# Patient Record
Sex: FEMALE | Race: ASIAN | NOT HISPANIC OR LATINO | URBAN - METROPOLITAN AREA
[De-identification: names, ages, dates, MRNs, and addresses within clinical notes are randomized per-mention and may not be internally consistent; named-entity substitution may affect disease eponyms.]

---

## 2019-06-03 ENCOUNTER — EMERGENCY (EMERGENCY)
Facility: HOSPITAL | Age: 64
LOS: 1 days | Discharge: HOME | End: 2019-06-03
Attending: EMERGENCY MEDICINE | Admitting: EMERGENCY MEDICINE
Payer: MEDICAID

## 2019-06-03 VITALS
OXYGEN SATURATION: 98 % | TEMPERATURE: 98 F | HEART RATE: 84 BPM | RESPIRATION RATE: 14 BRPM | SYSTOLIC BLOOD PRESSURE: 181 MMHG | DIASTOLIC BLOOD PRESSURE: 99 MMHG

## 2019-06-03 VITALS
OXYGEN SATURATION: 100 % | HEART RATE: 65 BPM | SYSTOLIC BLOOD PRESSURE: 193 MMHG | RESPIRATION RATE: 18 BRPM | DIASTOLIC BLOOD PRESSURE: 67 MMHG

## 2019-06-03 LAB
ALBUMIN SERPL ELPH-MCNC: 4.4 G/DL — SIGNIFICANT CHANGE UP (ref 3.3–5)
ALP SERPL-CCNC: 116 U/L — SIGNIFICANT CHANGE UP (ref 40–120)
ALT FLD-CCNC: 40 U/L — HIGH (ref 4–33)
ANION GAP SERPL CALC-SCNC: 12 MMO/L — SIGNIFICANT CHANGE UP (ref 7–14)
APPEARANCE UR: CLEAR — SIGNIFICANT CHANGE UP
AST SERPL-CCNC: 34 U/L — HIGH (ref 4–32)
BASOPHILS # BLD AUTO: 0.05 K/UL — SIGNIFICANT CHANGE UP (ref 0–0.2)
BASOPHILS NFR BLD AUTO: 0.7 % — SIGNIFICANT CHANGE UP (ref 0–2)
BILIRUB SERPL-MCNC: 0.7 MG/DL — SIGNIFICANT CHANGE UP (ref 0.2–1.2)
BILIRUB UR-MCNC: NEGATIVE — SIGNIFICANT CHANGE UP
BLOOD UR QL VISUAL: NEGATIVE — SIGNIFICANT CHANGE UP
BUN SERPL-MCNC: 20 MG/DL — SIGNIFICANT CHANGE UP (ref 7–23)
CALCIUM SERPL-MCNC: 9 MG/DL — SIGNIFICANT CHANGE UP (ref 8.4–10.5)
CHLORIDE SERPL-SCNC: 107 MMOL/L — SIGNIFICANT CHANGE UP (ref 98–107)
CO2 SERPL-SCNC: 25 MMOL/L — SIGNIFICANT CHANGE UP (ref 22–31)
COLOR SPEC: COLORLESS — SIGNIFICANT CHANGE UP
CREAT SERPL-MCNC: 0.91 MG/DL — SIGNIFICANT CHANGE UP (ref 0.5–1.3)
EOSINOPHIL # BLD AUTO: 0.39 K/UL — SIGNIFICANT CHANGE UP (ref 0–0.5)
EOSINOPHIL NFR BLD AUTO: 5.6 % — SIGNIFICANT CHANGE UP (ref 0–6)
GLUCOSE SERPL-MCNC: 172 MG/DL — HIGH (ref 70–99)
GLUCOSE UR-MCNC: NEGATIVE — SIGNIFICANT CHANGE UP
HCT VFR BLD CALC: 41 % — SIGNIFICANT CHANGE UP (ref 34.5–45)
HGB BLD-MCNC: 12.9 G/DL — SIGNIFICANT CHANGE UP (ref 11.5–15.5)
IMM GRANULOCYTES NFR BLD AUTO: 0.3 % — SIGNIFICANT CHANGE UP (ref 0–1.5)
KETONES UR-MCNC: NEGATIVE — SIGNIFICANT CHANGE UP
LEUKOCYTE ESTERASE UR-ACNC: HIGH
LYMPHOCYTES # BLD AUTO: 1.79 K/UL — SIGNIFICANT CHANGE UP (ref 1–3.3)
LYMPHOCYTES # BLD AUTO: 25.8 % — SIGNIFICANT CHANGE UP (ref 13–44)
MCHC RBC-ENTMCNC: 27.7 PG — SIGNIFICANT CHANGE UP (ref 27–34)
MCHC RBC-ENTMCNC: 31.5 % — LOW (ref 32–36)
MCV RBC AUTO: 88.2 FL — SIGNIFICANT CHANGE UP (ref 80–100)
MONOCYTES # BLD AUTO: 0.62 K/UL — SIGNIFICANT CHANGE UP (ref 0–0.9)
MONOCYTES NFR BLD AUTO: 8.9 % — SIGNIFICANT CHANGE UP (ref 2–14)
NEUTROPHILS # BLD AUTO: 4.06 K/UL — SIGNIFICANT CHANGE UP (ref 1.8–7.4)
NEUTROPHILS NFR BLD AUTO: 58.7 % — SIGNIFICANT CHANGE UP (ref 43–77)
NITRITE UR-MCNC: NEGATIVE — SIGNIFICANT CHANGE UP
NRBC # FLD: 0 K/UL — SIGNIFICANT CHANGE UP (ref 0–0)
PH UR: 7 — SIGNIFICANT CHANGE UP (ref 5–8)
PLATELET # BLD AUTO: 255 K/UL — SIGNIFICANT CHANGE UP (ref 150–400)
PMV BLD: 10.7 FL — SIGNIFICANT CHANGE UP (ref 7–13)
POTASSIUM SERPL-MCNC: 4.4 MMOL/L — SIGNIFICANT CHANGE UP (ref 3.5–5.3)
POTASSIUM SERPL-SCNC: 4.4 MMOL/L — SIGNIFICANT CHANGE UP (ref 3.5–5.3)
PROT SERPL-MCNC: 7.1 G/DL — SIGNIFICANT CHANGE UP (ref 6–8.3)
PROT UR-MCNC: NEGATIVE — SIGNIFICANT CHANGE UP
RBC # BLD: 4.65 M/UL — SIGNIFICANT CHANGE UP (ref 3.8–5.2)
RBC # FLD: 14.1 % — SIGNIFICANT CHANGE UP (ref 10.3–14.5)
RBC CASTS # UR COMP ASSIST: SIGNIFICANT CHANGE UP (ref 0–?)
SODIUM SERPL-SCNC: 144 MMOL/L — SIGNIFICANT CHANGE UP (ref 135–145)
SP GR SPEC: 1 — LOW (ref 1–1.04)
TROPONIN T, HIGH SENSITIVITY: 6 NG/L — SIGNIFICANT CHANGE UP (ref ?–14)
TROPONIN T, HIGH SENSITIVITY: 9 NG/L — SIGNIFICANT CHANGE UP (ref ?–14)
UROBILINOGEN FLD QL: NORMAL — SIGNIFICANT CHANGE UP
WBC # BLD: 6.93 K/UL — SIGNIFICANT CHANGE UP (ref 3.8–10.5)
WBC # FLD AUTO: 6.93 K/UL — SIGNIFICANT CHANGE UP (ref 3.8–10.5)
WBC UR QL: SIGNIFICANT CHANGE UP (ref 0–?)

## 2019-06-03 PROCEDURE — 71045 X-RAY EXAM CHEST 1 VIEW: CPT | Mod: 26

## 2019-06-03 PROCEDURE — 93010 ELECTROCARDIOGRAM REPORT: CPT

## 2019-06-03 PROCEDURE — 99285 EMERGENCY DEPT VISIT HI MDM: CPT | Mod: 25

## 2019-06-03 RX ORDER — AMLODIPINE BESYLATE 2.5 MG/1
1 TABLET ORAL
Qty: 28 | Refills: 0
Start: 2019-06-03 | End: 2019-06-16

## 2019-06-03 RX ORDER — AMLODIPINE BESYLATE 2.5 MG/1
5 TABLET ORAL ONCE
Refills: 0 | Status: COMPLETED | OUTPATIENT
Start: 2019-06-03 | End: 2019-06-03

## 2019-06-03 RX ADMIN — AMLODIPINE BESYLATE 5 MILLIGRAM(S): 2.5 TABLET ORAL at 06:30

## 2019-06-03 NOTE — ED PROVIDER NOTE - CLINICAL SUMMARY MEDICAL DECISION MAKING FREE TEXT BOX
63yo F w/ pmhx of Hypothyroidism, HLD brought in by daughter for lightheadedness and near-syncope. Pt has been fasting for Ramadan, woke up around 3am to eat, then started having these symptoms. pt also reports urinary frequency. In the room, BP was found to be elevated, 210/62, pt stated that BP intermittently goes up, but was never prescribed any medications by PCP. Denies any headache, blurry vision, chest pain, hematuria, nausea, vomiting or any other symptoms. PE is remarkable for elevated BP, will obtain labs, CXR, EKG, look for signs of end-organ damage and treat BP with oral meds.

## 2019-06-03 NOTE — ED PROVIDER NOTE - ATTENDING CONTRIBUTION TO CARE
64y F hx hld, neuropathy, hypothyroid p/w elevated BP & sensation of lightheadedness & presyncope today.  +urinary frequency. Not on any HTN meds.  Pt denied fvr/chills, HA, visual changes, dizziness, LOC, CP, SHELDON, PND, SOB, cough, hemoptysis, abd pain, n/v/d, LE edema, numbness, weakness, changes in gait or rashes  The pt also denied recent travel outside of the country, recent illnesses, sick contacts, recent airplane trips or periods of immobility/bedbound.     VS: afebrile, HTN to 210/2  Gen: Well appearing in NAD  Head: NC/AT  HEENT: mmm  Neck: trachea midline  CV: no murmur  Resp:  No distress  Ext: no deformities  Neuro:  A&Ox3  Skin:  Warm and dry as visualized  Psych:  Normal affect and mood    Pt has elevated BP in ED but is well appearing & has no other emergent sx (including new edema, sx c/w aortic dissection, intracranial bleed, CVA).  Given no BP meds, will check EKG, labs, UA, po BP meds, reassess.    Extensive education given incl importance of PMD f/u and med compliance.     MDM: 64y F hx hld, neuropathy, hypothyroid p/w elevated BP & sensation of lightheadedness & presyncope today, shortly after breaking her fast for Ramadan.  +urinary frequency. Not on any HTN meds.  Pt denied fvr/chills, HA, visual changes, dizziness, LOC, CP, SHELDON, PND, SOB, cough, hemoptysis, abd pain, n/v/d, LE edema, numbness, weakness, changes in gait or rashes  The pt also denied recent travel outside of the country, recent illnesses, sick contacts, recent airplane trips or periods of immobility/bedbound.     VS: afebrile, HTN to 210/2  Gen: Well appearing in NAD  Head: NC/AT  HEENT: mmm  Neck: trachea midline  CV: no murmur  Resp:  No distress  Ext: no deformities  Neuro:  A&Ox3  Skin:  Warm and dry as visualized  Psych:  Normal affect and mood    Pt has elevated BP in ED but is well appearing & has no other emergent sx (including new edema, sx c/w aortic dissection, intracranial bleed, CVA).  Given no home regular BP meds, unkn chronicity, will check EKG, labs, UA, po BP meds, reassess.    Extensive education given incl importance of PMD f/u and med compliance.   presyncope w/ reassuring EKG likely from fasting. 64y F hx hld, neuropathy, hypothyroid p/w elevated BP & sensation of lightheadedness & presyncope today, shortly after breaking her fast for Ramadan.  +urinary frequency. Not on any HTN meds.  Pt denied fvr/chills, HA, visual changes, dizziness, LOC, CP, SHELDON, PND, SOB, cough, hemoptysis, abd pain, n/v/d, LE edema, numbness, weakness, changes in gait or rashes  The pt also denied recent travel outside of the country, recent illnesses, sick contacts, recent airplane trips or periods of immobility/bedbound.     VS: afebrile, HTN to 210/2  Gen: Well appearing in NAD  Head: NC/AT  HEENT: mmm  Neck: trachea midline  CV: no murmur  Resp:  No distress  Ext: no deformities  Neuro:  A&Ox3  Skin:  Warm and dry as visualized  Psych:  Normal affect and mood    EKG: NSR@82, No ischemic S-T/Tw changes.     Pt has elevated BP in ED but is well appearing & has no other emergent sx (including new edema, sx c/w aortic dissection, intracranial bleed, CVA).  Given no home regular BP meds, unkn chronicity, will check EKG, labs, UA, po BP meds, reassess.    Extensive education given incl importance of PMD f/u and med compliance.   presyncope w/ reassuring EKG likely from fasting. 64y F hx hld, neuropathy, hypothyroid p/w elevated BP & sensation of lightheadedness, shortly after breaking her fast for Ramadan.  +urinary frequency. Not on any HTN meds.  Pt denied fvr/chills, HA, visual changes, dizziness, LOC, CP, SHELDON, PND, SOB, cough, hemoptysis, abd pain, n/v/d, LE edema, numbness, weakness, changes in gait or rashes  The pt also denied recent travel outside of the country, recent illnesses, sick contacts, recent airplane trips or periods of immobility/bedbound.     VS: afebrile, HTN to 210/62  Gen: Well appearing in NAD  Head: NC/AT  HEENT: mmm  Neck: trachea midline  CV: no murmur  Resp:  No distress, no crackles  Ext: no deformities, 1+ edema  Neuro:  A&Ox3, CN2-12 intact, nl p/m/s x4ext  Skin:  Warm and dry as visualized  Psych:  Normal affect and mood    EKG: NSR@82, No ischemic S-T/Tw changes.     Pt has elevated BP in ED but is well appearing & has no other emergent sx (including new edema, sx c/w aortic dissection, intracranial bleed, CVA).  Given no home regular BP meds, unkn chronicity, will check EKG, labs, UA, po BP meds, reassess.    Extensive education given incl importance of PMD f/u and med compliance.   dizziness w/ reassuring EKG likely from fasting.

## 2019-06-03 NOTE — ED PROVIDER NOTE - PROGRESS NOTE DETAILS
[facility] PGY1- BP improved, 190s/60s, no sx, repeating 2nd troponin, UA with small leuk esterase but minimal WBCs, will follow cx but not treat at this time, offered obs for syncope w/u but pt and daughter would rather see PCP this week, will send ERX for 2 weeks of norvasc, stable for d/c, pt and daughter aware and amenable to plan, return precautions given

## 2019-06-03 NOTE — ED PROVIDER NOTE - NSFOLLOWUPINSTRUCTIONS_ED_ALL_ED_FT
You had near syncope.    You need to see your doctor THIS WEEK for follow up.     Take the NORVASC 2 times per day at home until you see your doctor.    Return for any new or concerning symptoms.

## 2019-06-03 NOTE — ED PROVIDER NOTE - OBJECTIVE STATEMENT
63yo F w/ pmhx of Hypothyroidism, HLD brought in by daughter for lightheadedness and near-syncope. Pt has been fasting for Ramadan, woke up around 3am to eat, then started having these symptoms. pt also reports urinary frequency. In the room, BP was found to be elevated, 210/62, pt stated that BP intermittently goes up, but was never prescribed any medications by PCP. Denies any headache, blurry vision, chest pain, hematuria, nausea, vomiting or any other symptoms.

## 2019-06-03 NOTE — ED ADULT NURSE NOTE - OBJECTIVE STATEMENT
Pt received to room 22. Pt comes to ED c/o of hypertension. Pt states she took her BP today and it was "high, 150/80." Today in ED BP is 210/65. Pt reports BP flu Pt received to room 22. Pt comes to ED c/o of hypertension. Pt states she took her BP today and it was "high, 150/80." Today in ED BP is 210/65. Pt reports BP fluctuates, "sometimes it is normal and sometimes it is high." Pt saw PMD approximately one month ago and was not prescribed anything for BP. Pt endorses dizziness, lightheadedness, and feels like "she could pass out" when walking. Pt denies visual disturbances, chest pain, dyspnea, cough, fever, chills, palpitations, changes in gait. Pt has hx of hyperlipidemia, neuropathy, & hypothyroidism. Pt is NSR on cardiac monitor HR in 80s. Daughter at bedside. Pt is Occitan speaking, daughter translating with permission from pt

## 2019-06-03 NOTE — ED ADULT TRIAGE NOTE - CHIEF COMPLAINT QUOTE
pt endorses high bp and near syncopal episode. denies any pain or headache. reports dizziness and heartburn. reports "intermitten high bp but no meds"

## 2019-06-04 LAB
BACTERIA UR CULT: SIGNIFICANT CHANGE UP
SPECIMEN SOURCE: SIGNIFICANT CHANGE UP

## 2019-12-20 ENCOUNTER — EMERGENCY (EMERGENCY)
Facility: HOSPITAL | Age: 64
LOS: 1 days | Discharge: HOME | End: 2019-12-20
Attending: EMERGENCY MEDICINE | Admitting: EMERGENCY MEDICINE
Payer: MEDICAID

## 2019-12-20 PROCEDURE — 99283 EMERGENCY DEPT VISIT LOW MDM: CPT | Mod: 25

## 2019-12-20 PROCEDURE — 93010 ELECTROCARDIOGRAM REPORT: CPT

## 2019-12-21 VITALS
OXYGEN SATURATION: 100 % | RESPIRATION RATE: 18 BRPM | SYSTOLIC BLOOD PRESSURE: 185 MMHG | DIASTOLIC BLOOD PRESSURE: 76 MMHG | HEART RATE: 64 BPM | TEMPERATURE: 98 F

## 2019-12-21 VITALS
DIASTOLIC BLOOD PRESSURE: 76 MMHG | RESPIRATION RATE: 16 BRPM | HEART RATE: 61 BPM | OXYGEN SATURATION: 100 % | SYSTOLIC BLOOD PRESSURE: 186 MMHG | TEMPERATURE: 98 F

## 2019-12-21 NOTE — ED PROVIDER NOTE - PATIENT PORTAL LINK FT
You can access the FollowMyHealth Patient Portal offered by [facility] by registering at the following website: [email or web address]. By joining [facility]’s FollowMyHealth portal, you will also be able to view your health information using other applications (apps) compatible with our system.

## 2019-12-21 NOTE — ED PROVIDER NOTE - PHYSICAL EXAMINATION
GEN: NAD, awake, well appearing  HEENT: NCAT, MMM, normal conjunctiva, perrl  CHEST/LUNGS: Non-tachypneic, CTAB, bilateral breath sounds  CARDIAC: Non-tachycardic, s1s2, normal perfusion, no peripheral edema  ABDOMEN: Soft, NTND, No rebound/guarding  MSK: No joint tenderness, no gross deformity of extremities, mild reproducible lower back pain with ambulation and left shoulder discomfort with ROM   SKIN: No rashes, no petechiae, no vesicles  NEURO: CN grossly intact, normal coordination, no focal motor or sensory deficits  PSYCH: Alert, appropriate, cooperative

## 2019-12-21 NOTE — ED PROVIDER NOTE - NS ED ROS FT
GENERAL: No fever or chills  EYES: no change in vision  HEENT: no trouble swallowing or speaking  CARDIAC: no chest pain or palpitations  PULMONARY: no cough or SOB  GI: no abdominal pain, nausea, vomiting, diarrhea, or constipation   : No changes in urination  SKIN: no rashes  NEURO: no headache, numbness, or weakness  MSK: lower back pain, left shoulder pain

## 2019-12-21 NOTE — ED ADULT NURSE NOTE - NSIMPLEMENTINTERV_GEN_ALL_ED
Implemented All Universal Safety Interventions:  [employer] [place] to call system. Call bell, personal items and telephone within reach. Instruct patient to call for assistance. Room bathroom lighting operational. Non-slip footwear when patient is off stretcher. Physically safe environment: no spills, clutter or unnecessary equipment. Stretcher in lowest position, wheels locked, appropriate side rails in place.

## 2019-12-21 NOTE — ED PROVIDER NOTE - ATTENDING CONTRIBUTION TO CARE
HPI: 64F with Past Medical History of Osteoarthritis, HTN, vertigo presenting with elevated BP. States took her BP tonight and was elevated to 200s systolic. Took her evening dose of losartan with no improvement after 1 hour and came to ED with BP improved in triage. Endorses baseline BP in 160s systolic otherwise compliant with her medications. Endorses episode of vertigo yesterday improved on meclizine.  received MR brain several months ago, following with neurology, diagnosed with peripheral vertigo on intermittent meclizine. no dizziness today. Otherwise endorses lower back pain when she walks and left shoulder discomfort for a few days both are chronic and has seen neuro in past without any new/recent trauma, no numbness/tingling, diff walking, constipation, diarrhea/incontinence. No fever, chills, cp, sob, n/v/d, dizziness, dysuria, hematuria.  EXAM: NAD, CN 2-12 intact, gait normal, eyes EOMI/PERRL, no nystagmus, heart RRR, lungs ctab, abd soft nontender, moving all 4 ext, pulses normal x 4.   MDM: pt with asymptomatic HTN. Normal exam. Most likely due to pain leading to pain vs not on correct dosage of meds vs need for higher dosage vs add new HTN meds. Since asymptomatic, and EKG obtained in triage normal without ischemia. will discharge, f/u with PMD tomorrow. No need for labs and no indication for imaging.

## 2019-12-21 NOTE — ED PROVIDER NOTE - OBJECTIVE STATEMENT
64F with pmh arthritis, HTN, vertigo presenting with elevated BP. States took her BP tonight and was elevated to 200s systolic. Took her evening dose of losartan with no improvement after 1 hour and came to ED with BP improved in triage. Endorses baseline BP in 160s systolic otherwise compliant with her medications. Endorses episode of vertigo yesterday improved on meclizine.  received MR brain several months ago, following with neurology, diagnosed with peripheral vertigo on intermittent meclizine. no dizziness today. Otherwise endorses lower back pain when she walks and left shoulder discomfort for a few days. No fever, chills, cp, sob, n/v/d, dizziness, dysuria

## 2019-12-21 NOTE — ED ADULT TRIAGE NOTE - CHIEF COMPLAINT QUOTE
Dizziness since Yesterday. BP was 189/90 at home and she took Losartan 50mg an hour ago. also c/o pain to the back pf her head. PMH of HTN, high cholesterol, hypothyroidism.

## 2019-12-21 NOTE — ED PROVIDER NOTE - NSFOLLOWUPINSTRUCTIONS_ED_ALL_ED_FT
Please follow up with your primary care doctor after you leave the emergency department so that they can follow up and conduct more testing and treatment as they deem necessary. If you have worsening signs or symptoms of what you came in to the Emergency Department today and are not able to see your doctor, go to your nearest emergency department or return to the [facility] emergency department for further care and management.

## 2019-12-21 NOTE — ED ADULT NURSE NOTE - OBJECTIVE STATEMENT
pt brought into room 5 A&xO4 amb self care 64 yr old female presents to the ed today for HTN and all over pain. pt states she experienced dizziness that has resolved. patient denies SOB/CP at this time. speaking in clear and full sentences.

## 2022-05-30 ENCOUNTER — NON-APPOINTMENT (OUTPATIENT)
Age: 67
End: 2022-05-30

## 2023-05-22 PROBLEM — Z00.00 ENCOUNTER FOR PREVENTIVE HEALTH EXAMINATION: Status: ACTIVE | Noted: 2023-05-22

## 2023-05-23 PROBLEM — I10 ESSENTIAL (PRIMARY) HYPERTENSION: Chronic | Status: ACTIVE | Noted: 2019-12-21

## 2023-05-30 ENCOUNTER — APPOINTMENT (OUTPATIENT)
Dept: ORTHOPEDIC SURGERY | Facility: CLINIC | Age: 68
End: 2023-05-30

## 2025-05-22 NOTE — ED PROVIDER NOTE - CLINICAL SUMMARY MEDICAL DECISION MAKING FREE TEXT BOX
Patient presenting asymptomatic hypertension. Otherwise with mild msk pain likely 2/2 arthritis. EKG unremarkable.
Negative

## 2025-08-07 ENCOUNTER — APPOINTMENT (OUTPATIENT)
Dept: ORTHOPEDIC SURGERY | Facility: CLINIC | Age: 70
End: 2025-08-07
Payer: MEDICARE

## 2025-08-07 VITALS
WEIGHT: 172 LBS | HEART RATE: 60 BPM | OXYGEN SATURATION: 96 % | DIASTOLIC BLOOD PRESSURE: 80 MMHG | HEIGHT: 59 IN | SYSTOLIC BLOOD PRESSURE: 149 MMHG | BODY MASS INDEX: 34.68 KG/M2

## 2025-08-07 DIAGNOSIS — M25.561 PAIN IN RIGHT KNEE: ICD-10-CM

## 2025-08-07 DIAGNOSIS — M25.562 PAIN IN RIGHT KNEE: ICD-10-CM

## 2025-08-07 PROCEDURE — 73564 X-RAY EXAM KNEE 4 OR MORE: CPT | Mod: LT,RT

## 2025-08-07 PROCEDURE — 99203 OFFICE O/P NEW LOW 30 MIN: CPT | Mod: 25

## 2025-08-07 PROCEDURE — 72100 X-RAY EXAM L-S SPINE 2/3 VWS: CPT
